# Patient Record
Sex: FEMALE | Race: WHITE | NOT HISPANIC OR LATINO | Employment: FULL TIME | URBAN - METROPOLITAN AREA
[De-identification: names, ages, dates, MRNs, and addresses within clinical notes are randomized per-mention and may not be internally consistent; named-entity substitution may affect disease eponyms.]

---

## 2017-03-10 ENCOUNTER — COMMUNICATION - HEALTHEAST (OUTPATIENT)
Dept: FAMILY MEDICINE | Facility: CLINIC | Age: 42
End: 2017-03-10

## 2017-04-26 ENCOUNTER — OFFICE VISIT - HEALTHEAST (OUTPATIENT)
Dept: FAMILY MEDICINE | Facility: CLINIC | Age: 42
End: 2017-04-26

## 2017-04-26 DIAGNOSIS — R53.81 OTHER MALAISE AND FATIGUE: ICD-10-CM

## 2017-04-26 DIAGNOSIS — G43.909 MIGRAINE HEADACHE: ICD-10-CM

## 2017-04-26 DIAGNOSIS — R53.83 OTHER MALAISE AND FATIGUE: ICD-10-CM

## 2017-04-26 DIAGNOSIS — K21.9 ACID REFLUX: ICD-10-CM

## 2017-04-26 DIAGNOSIS — Z00.00 ROUTINE GENERAL MEDICAL EXAMINATION AT A HEALTH CARE FACILITY: ICD-10-CM

## 2017-04-26 ASSESSMENT — MIFFLIN-ST. JEOR: SCORE: 1462.82

## 2017-05-01 ENCOUNTER — COMMUNICATION - HEALTHEAST (OUTPATIENT)
Dept: FAMILY MEDICINE | Facility: CLINIC | Age: 42
End: 2017-05-01

## 2017-05-04 ENCOUNTER — OFFICE VISIT - HEALTHEAST (OUTPATIENT)
Dept: FAMILY MEDICINE | Facility: CLINIC | Age: 42
End: 2017-05-04

## 2017-05-04 DIAGNOSIS — Z13.220 SCREENING, LIPID: ICD-10-CM

## 2017-05-04 DIAGNOSIS — M79.674 PAIN OF GREAT TOE, RIGHT: ICD-10-CM

## 2017-05-04 DIAGNOSIS — Z13.1 SCREENING FOR DIABETES MELLITUS: ICD-10-CM

## 2017-05-04 LAB
CHOLEST SERPL-MCNC: 187 MG/DL
FASTING STATUS PATIENT QL REPORTED: YES
HDLC SERPL-MCNC: 49 MG/DL
LDLC SERPL CALC-MCNC: 125 MG/DL
TRIGL SERPL-MCNC: 64 MG/DL

## 2017-05-08 ENCOUNTER — OFFICE VISIT - HEALTHEAST (OUTPATIENT)
Dept: FAMILY MEDICINE | Facility: CLINIC | Age: 42
End: 2017-05-08

## 2017-05-08 DIAGNOSIS — R53.83 FATIGUE: ICD-10-CM

## 2017-05-08 ASSESSMENT — MIFFLIN-ST. JEOR: SCORE: 1467.35

## 2017-05-09 ENCOUNTER — RECORDS - HEALTHEAST (OUTPATIENT)
Dept: ADMINISTRATIVE | Facility: OTHER | Age: 42
End: 2017-05-09

## 2017-05-18 ENCOUNTER — OFFICE VISIT - HEALTHEAST (OUTPATIENT)
Dept: FAMILY MEDICINE | Facility: CLINIC | Age: 42
End: 2017-05-18
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
## 2017-06-06 ENCOUNTER — OFFICE VISIT - HEALTHEAST (OUTPATIENT)
Dept: FAMILY MEDICINE | Facility: CLINIC | Age: 42
End: 2017-06-06
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
## 2017-06-06 DIAGNOSIS — S13.4XXD WHIPLASH INJURY SYNDROME, SUBSEQUENT ENCOUNTER: ICD-10-CM

## 2017-06-12 ENCOUNTER — OFFICE VISIT - HEALTHEAST (OUTPATIENT)
Dept: PHYSICAL THERAPY | Facility: REHABILITATION | Age: 42
End: 2017-06-12

## 2017-06-12 DIAGNOSIS — M54.2 ACUTE NECK PAIN: ICD-10-CM

## 2017-06-12 DIAGNOSIS — R29.898 DECREASED ROM OF NECK: ICD-10-CM

## 2017-06-12 DIAGNOSIS — Z87.828 HISTORY OF MOTOR VEHICLE ACCIDENT: ICD-10-CM

## 2017-06-26 ENCOUNTER — AMBULATORY - HEALTHEAST (OUTPATIENT)
Dept: FAMILY MEDICINE | Facility: CLINIC | Age: 42
End: 2017-06-26

## 2017-06-26 ENCOUNTER — COMMUNICATION - HEALTHEAST (OUTPATIENT)
Dept: FAMILY MEDICINE | Facility: CLINIC | Age: 42
End: 2017-06-26

## 2017-06-26 DIAGNOSIS — L98.9 SKIN LESIONS: ICD-10-CM

## 2017-06-26 DIAGNOSIS — L73.2 HIDRADENITIS SUPPURATIVA: ICD-10-CM

## 2017-10-04 ENCOUNTER — COMMUNICATION - HEALTHEAST (OUTPATIENT)
Dept: FAMILY MEDICINE | Facility: CLINIC | Age: 42
End: 2017-10-04

## 2017-10-06 ENCOUNTER — AMBULATORY - HEALTHEAST (OUTPATIENT)
Dept: NURSING | Facility: CLINIC | Age: 42
End: 2017-10-06

## 2017-10-20 ENCOUNTER — COMMUNICATION - HEALTHEAST (OUTPATIENT)
Dept: FAMILY MEDICINE | Facility: CLINIC | Age: 42
End: 2017-10-20

## 2017-10-20 ENCOUNTER — AMBULATORY - HEALTHEAST (OUTPATIENT)
Dept: FAMILY MEDICINE | Facility: CLINIC | Age: 42
End: 2017-10-20

## 2017-10-20 DIAGNOSIS — L73.2 HIDRADENITIS: ICD-10-CM

## 2017-10-23 ENCOUNTER — AMBULATORY - HEALTHEAST (OUTPATIENT)
Dept: FAMILY MEDICINE | Facility: CLINIC | Age: 42
End: 2017-10-23

## 2017-10-23 DIAGNOSIS — L72.11 PILAR CYSTS: ICD-10-CM

## 2017-11-10 ENCOUNTER — OFFICE VISIT - HEALTHEAST (OUTPATIENT)
Dept: FAMILY MEDICINE | Facility: CLINIC | Age: 42
End: 2017-11-10

## 2017-11-10 DIAGNOSIS — Z01.818 PREOPERATIVE CLEARANCE: ICD-10-CM

## 2017-11-10 ASSESSMENT — MIFFLIN-ST. JEOR: SCORE: 1372.1

## 2017-11-14 ENCOUNTER — COMMUNICATION - HEALTHEAST (OUTPATIENT)
Dept: FAMILY MEDICINE | Facility: CLINIC | Age: 42
End: 2017-11-14

## 2017-11-20 ENCOUNTER — ANESTHESIA - HEALTHEAST (OUTPATIENT)
Dept: SURGERY | Facility: CLINIC | Age: 42
End: 2017-11-20

## 2017-11-20 ENCOUNTER — SURGERY - HEALTHEAST (OUTPATIENT)
Dept: SURGERY | Facility: CLINIC | Age: 42
End: 2017-11-20

## 2017-11-20 ASSESSMENT — MIFFLIN-ST. JEOR: SCORE: 1372.1

## 2018-01-30 ENCOUNTER — COMMUNICATION - HEALTHEAST (OUTPATIENT)
Dept: FAMILY MEDICINE | Facility: CLINIC | Age: 43
End: 2018-01-30

## 2018-01-30 ENCOUNTER — AMBULATORY - HEALTHEAST (OUTPATIENT)
Dept: FAMILY MEDICINE | Facility: CLINIC | Age: 43
End: 2018-01-30

## 2018-01-30 DIAGNOSIS — G43.909 MIGRAINE: ICD-10-CM

## 2018-01-31 ENCOUNTER — COMMUNICATION - HEALTHEAST (OUTPATIENT)
Dept: FAMILY MEDICINE | Facility: CLINIC | Age: 43
End: 2018-01-31

## 2018-02-14 ENCOUNTER — OFFICE VISIT - HEALTHEAST (OUTPATIENT)
Dept: FAMILY MEDICINE | Facility: CLINIC | Age: 43
End: 2018-02-14

## 2018-02-14 DIAGNOSIS — M54.9 LEFT-SIDED BACK PAIN: ICD-10-CM

## 2018-02-14 DIAGNOSIS — Z00.00 ROUTINE GENERAL MEDICAL EXAMINATION AT A HEALTH CARE FACILITY: ICD-10-CM

## 2018-02-14 DIAGNOSIS — Z51.81 THERAPEUTIC DRUG MONITORING: ICD-10-CM

## 2018-02-14 DIAGNOSIS — E03.9 HYPOTHYROID: ICD-10-CM

## 2018-02-14 DIAGNOSIS — Z13.0 SCREENING FOR DEFICIENCY ANEMIA: ICD-10-CM

## 2018-02-14 DIAGNOSIS — Z13.220 SCREENING FOR HYPERLIPIDEMIA: ICD-10-CM

## 2018-02-14 DIAGNOSIS — M25.552 LEFT HIP PAIN: ICD-10-CM

## 2018-02-14 DIAGNOSIS — M79.605 LEFT LEG PAIN: ICD-10-CM

## 2018-02-14 ASSESSMENT — MIFFLIN-ST. JEOR: SCORE: 1290.45

## 2018-02-19 ENCOUNTER — RECORDS - HEALTHEAST (OUTPATIENT)
Dept: ADMINISTRATIVE | Facility: OTHER | Age: 43
End: 2018-02-19

## 2018-02-20 ENCOUNTER — COMMUNICATION - HEALTHEAST (OUTPATIENT)
Dept: FAMILY MEDICINE | Facility: CLINIC | Age: 43
End: 2018-02-20

## 2018-02-20 ENCOUNTER — AMBULATORY - HEALTHEAST (OUTPATIENT)
Dept: LAB | Facility: CLINIC | Age: 43
End: 2018-02-20

## 2018-02-20 DIAGNOSIS — Z13.220 SCREENING FOR HYPERLIPIDEMIA: ICD-10-CM

## 2018-02-20 DIAGNOSIS — Z51.81 THERAPEUTIC DRUG MONITORING: ICD-10-CM

## 2018-02-20 DIAGNOSIS — Z13.0 SCREENING FOR DEFICIENCY ANEMIA: ICD-10-CM

## 2018-02-20 DIAGNOSIS — E03.9 HYPOTHYROID: ICD-10-CM

## 2018-02-20 LAB
ANION GAP SERPL CALCULATED.3IONS-SCNC: 5 MMOL/L (ref 5–18)
BUN SERPL-MCNC: 11 MG/DL (ref 8–22)
CALCIUM SERPL-MCNC: 9.1 MG/DL (ref 8.5–10.5)
CHLORIDE BLD-SCNC: 108 MMOL/L (ref 98–107)
CHOLEST SERPL-MCNC: 159 MG/DL
CO2 SERPL-SCNC: 27 MMOL/L (ref 22–31)
CREAT SERPL-MCNC: 0.82 MG/DL (ref 0.6–1.1)
FASTING STATUS PATIENT QL REPORTED: YES
GFR SERPL CREATININE-BSD FRML MDRD: >60 ML/MIN/1.73M2
GLUCOSE BLD-MCNC: 79 MG/DL (ref 70–125)
HDLC SERPL-MCNC: 47 MG/DL
HGB BLD-MCNC: 11.4 G/DL (ref 12–16)
LDLC SERPL CALC-MCNC: 99 MG/DL
POTASSIUM BLD-SCNC: 4.4 MMOL/L (ref 3.5–5)
SODIUM SERPL-SCNC: 140 MMOL/L (ref 136–145)
T4 FREE SERPL-MCNC: 1 NG/DL (ref 0.7–1.8)
TRIGL SERPL-MCNC: 65 MG/DL
TSH SERPL DL<=0.005 MIU/L-ACNC: 1.83 UIU/ML (ref 0.3–5)

## 2018-04-04 ENCOUNTER — RECORDS - HEALTHEAST (OUTPATIENT)
Dept: ADMINISTRATIVE | Facility: OTHER | Age: 43
End: 2018-04-04

## 2018-06-13 ENCOUNTER — COMMUNICATION - HEALTHEAST (OUTPATIENT)
Dept: FAMILY MEDICINE | Facility: CLINIC | Age: 43
End: 2018-06-13

## 2018-07-23 ENCOUNTER — COMMUNICATION - HEALTHEAST (OUTPATIENT)
Dept: INTERNAL MEDICINE | Facility: CLINIC | Age: 43
End: 2018-07-23

## 2018-07-30 ENCOUNTER — OFFICE VISIT - HEALTHEAST (OUTPATIENT)
Dept: INTERNAL MEDICINE | Facility: CLINIC | Age: 43
End: 2018-07-30

## 2018-07-30 DIAGNOSIS — F43.21 SITUATIONAL DEPRESSION: ICD-10-CM

## 2018-07-30 DIAGNOSIS — F41.9 ANXIETY: ICD-10-CM

## 2018-07-30 DIAGNOSIS — R53.82 CHRONIC FATIGUE: ICD-10-CM

## 2018-07-30 DIAGNOSIS — L73.2 HYDRADENITIS: ICD-10-CM

## 2018-07-30 DIAGNOSIS — Z01.818 PRE-OP EXAM: ICD-10-CM

## 2018-07-30 LAB
BASOPHILS # BLD AUTO: 0 THOU/UL (ref 0–0.2)
BASOPHILS NFR BLD AUTO: 1 % (ref 0–2)
EOSINOPHIL # BLD AUTO: 0.1 THOU/UL (ref 0–0.4)
EOSINOPHIL NFR BLD AUTO: 1 % (ref 0–6)
ERYTHROCYTE [DISTWIDTH] IN BLOOD BY AUTOMATED COUNT: 12.5 % (ref 11–14.5)
HCT VFR BLD AUTO: 38.6 % (ref 35–47)
HGB BLD-MCNC: 12.7 G/DL (ref 12–16)
LYMPHOCYTES # BLD AUTO: 2.1 THOU/UL (ref 0.8–4.4)
LYMPHOCYTES NFR BLD AUTO: 29 % (ref 20–40)
MCH RBC QN AUTO: 28.4 PG (ref 27–34)
MCHC RBC AUTO-ENTMCNC: 32.8 G/DL (ref 32–36)
MCV RBC AUTO: 87 FL (ref 80–100)
MONOCYTES # BLD AUTO: 0.4 THOU/UL (ref 0–0.9)
MONOCYTES NFR BLD AUTO: 6 % (ref 2–10)
NEUTROPHILS # BLD AUTO: 4.4 THOU/UL (ref 2–7.7)
NEUTROPHILS NFR BLD AUTO: 63 % (ref 50–70)
PLATELET # BLD AUTO: 283 THOU/UL (ref 140–440)
PMV BLD AUTO: 8.1 FL (ref 7–10)
RBC # BLD AUTO: 4.45 MILL/UL (ref 3.8–5.4)
WBC: 7 THOU/UL (ref 4–11)

## 2018-07-30 ASSESSMENT — MIFFLIN-ST. JEOR: SCORE: 1260.06

## 2018-07-31 LAB
ANION GAP SERPL CALCULATED.3IONS-SCNC: 9 MMOL/L (ref 5–18)
BUN SERPL-MCNC: 13 MG/DL (ref 8–22)
CALCIUM SERPL-MCNC: 9.1 MG/DL (ref 8.5–10.5)
CHLORIDE BLD-SCNC: 106 MMOL/L (ref 98–107)
CO2 SERPL-SCNC: 27 MMOL/L (ref 22–31)
CREAT SERPL-MCNC: 0.81 MG/DL (ref 0.6–1.1)
GFR SERPL CREATININE-BSD FRML MDRD: >60 ML/MIN/1.73M2
GLUCOSE BLD-MCNC: 73 MG/DL (ref 70–125)
IRON SATN MFR SERPL: 27 % (ref 20–50)
IRON SERPL-MCNC: 86 UG/DL (ref 42–175)
POTASSIUM BLD-SCNC: 4.4 MMOL/L (ref 3.5–5)
SODIUM SERPL-SCNC: 142 MMOL/L (ref 136–145)
TIBC SERPL-MCNC: 323 UG/DL (ref 313–563)
TRANSFERRIN SERPL-MCNC: 258 MG/DL (ref 212–360)
TSH SERPL DL<=0.005 MIU/L-ACNC: 1.73 UIU/ML (ref 0.3–5)

## 2018-08-29 ENCOUNTER — COMMUNICATION - HEALTHEAST (OUTPATIENT)
Dept: FAMILY MEDICINE | Facility: CLINIC | Age: 43
End: 2018-08-29

## 2018-08-29 DIAGNOSIS — E03.9 HYPOTHYROIDISM: ICD-10-CM

## 2018-10-17 ENCOUNTER — AMBULATORY - HEALTHEAST (OUTPATIENT)
Dept: NURSING | Facility: CLINIC | Age: 43
End: 2018-10-17

## 2018-10-23 ENCOUNTER — COMMUNICATION - HEALTHEAST (OUTPATIENT)
Dept: INTERNAL MEDICINE | Facility: CLINIC | Age: 43
End: 2018-10-23

## 2018-10-23 DIAGNOSIS — F41.9 ANXIETY: ICD-10-CM

## 2018-10-23 DIAGNOSIS — F43.21 SITUATIONAL DEPRESSION: ICD-10-CM

## 2019-02-01 ENCOUNTER — COMMUNICATION - HEALTHEAST (OUTPATIENT)
Dept: FAMILY MEDICINE | Facility: CLINIC | Age: 44
End: 2019-02-01

## 2019-11-28 ENCOUNTER — HOSPITAL ENCOUNTER (EMERGENCY)
Facility: HOSPITAL | Age: 44
Discharge: HOME | End: 2019-11-28
Attending: EMERGENCY MEDICINE
Payer: COMMERCIAL

## 2019-11-28 VITALS
HEART RATE: 75 BPM | BODY MASS INDEX: 22.49 KG/M2 | OXYGEN SATURATION: 100 % | RESPIRATION RATE: 18 BRPM | WEIGHT: 135 LBS | HEIGHT: 65 IN | DIASTOLIC BLOOD PRESSURE: 78 MMHG | TEMPERATURE: 97.7 F | SYSTOLIC BLOOD PRESSURE: 126 MMHG

## 2019-11-28 DIAGNOSIS — S61.411A LACERATION OF RIGHT HAND WITHOUT FOREIGN BODY, INITIAL ENCOUNTER: Primary | ICD-10-CM

## 2019-11-28 PROCEDURE — 12001 RPR S/N/AX/GEN/TRNK 2.5CM/<: CPT | Performed by: NURSE PRACTITIONER

## 2019-11-28 PROCEDURE — 99282 EMERGENCY DEPT VISIT SF MDM: CPT | Mod: 25

## 2019-11-28 PROCEDURE — 0HQFXZZ REPAIR RIGHT HAND SKIN, EXTERNAL APPROACH: ICD-10-PCS | Performed by: EMERGENCY MEDICINE

## 2019-11-28 PROCEDURE — 90471 IMMUNIZATION ADMIN: CPT | Performed by: NURSE PRACTITIONER

## 2019-11-28 PROCEDURE — 90715 TDAP VACCINE 7 YRS/> IM: CPT | Performed by: NURSE PRACTITIONER

## 2019-11-28 PROCEDURE — 63600000 HC DRUGS/DETAIL CODE: Performed by: NURSE PRACTITIONER

## 2019-11-28 RX ORDER — ESCITALOPRAM OXALATE 10 MG/1
10 TABLET ORAL
COMMUNITY
Start: 2018-10-25 | End: 2020-01-10

## 2019-11-28 RX ORDER — FERROUS SULFATE 325(65) MG
1 TABLET ORAL
COMMUNITY

## 2019-11-28 RX ORDER — LEVOTHYROXINE SODIUM 25 UG/1
50 TABLET ORAL
COMMUNITY
Start: 2018-08-29 | End: 2020-02-28

## 2019-11-28 RX ORDER — SPIRONOLACTONE 50 MG/1
50 TABLET, FILM COATED ORAL
COMMUNITY
Start: 2017-06-26 | End: 2020-01-10

## 2019-11-28 RX ADMIN — TETANUS TOXOID, REDUCED DIPHTHERIA TOXOID AND ACELLULAR PERTUSSIS VACCINE, ADSORBED 0.5 ML: 5; 2.5; 8; 8; 2.5 SUSPENSION INTRAMUSCULAR at 12:50

## 2019-11-28 SDOH — HEALTH STABILITY: MENTAL HEALTH: HOW OFTEN DO YOU HAVE A DRINK CONTAINING ALCOHOL?: NEVER

## 2019-11-28 ASSESSMENT — ENCOUNTER SYMPTOMS
ARTHRALGIAS: 0
WEAKNESS: 0
WOUND: 1
NUMBNESS: 0
BRUISES/BLEEDS EASILY: 0

## 2019-11-28 NOTE — ED PROVIDER NOTES
"HPI     Chief Complaint   Patient presents with   • Upper Extremity Issue   • Laceration     44 y.o. female presents to ED c/o right hand laceration PTA. Pt reports she fell down the stairs on her bottom and sustained a laceration to her 3rd finger on her right hand. Unsure what she struck her hand against. She denies head injury or LOC from the fall. States she has been ambulatory since the fall without pain. Pt wrapped the wound in a towel and applied pressure to control the bleeding. Pt denies any pain, numbness or weakness in her hand. Pt reports last tetanus vaccination was 10 years ago. Pt is right hand dominant.           HPI     Patient History     Past Medical History:   Diagnosis Date   • Hyperthyroidism        History reviewed. No pertinent surgical history.    History reviewed. No pertinent family history.    Social History     Tobacco Use   • Smoking status: Never Smoker   • Smokeless tobacco: Never Used   Substance Use Topics   • Alcohol use: Never     Frequency: Never   • Drug use: Never       Systems Reviewed from Nursing Triage:  Tobacco  Allergies  Meds  Problems  Med Hx  Surg Hx  Soc Hx         Review of Systems     Review of Systems   Musculoskeletal: Negative for arthralgias.   Skin: Positive for wound (hand laceration).   Neurological: Negative for weakness and numbness.        (-) head injury   Hematological: Does not bruise/bleed easily.        Physical Exam     ED Triage Vitals [11/28/19 1226]   Temp Heart Rate Resp BP SpO2   36.5 °C (97.7 °F) 75 18 126/78 100 %      Temp src Heart Rate Source Patient Position BP Location FiO2 (%) (Set)   -- -- -- -- --                     Patient Vitals for the past 24 hrs:   BP Temp Pulse Resp SpO2 Height Weight   11/28/19 1226 126/78 36.5 °C (97.7 °F) 75 18 100 % 1.651 m (5' 5\") 61.2 kg (135 lb)                                       Physical Exam   Constitutional: She is oriented to person, place, and time. She appears well-developed. No distress. "   HENT:   Head: Atraumatic.   Eyes: Conjunctivae are normal.   Neck: Normal range of motion.   Pulmonary/Chest: Effort normal. No respiratory distress.   Musculoskeletal: She exhibits no deformity.        Right hand: She exhibits laceration.        Hands:  Neurological: She is alert and oriented to person, place, and time. No sensory deficit.   Skin: Skin is warm and dry.   Psychiatric: She has a normal mood and affect. Her behavior is normal.   Nursing note and vitals reviewed.           Laceration Repair  Date/Time: 11/28/2019 12:52 PM  Performed by: Pallante, Elizabeth P, CRNP  Authorized by: Elieser Ramirez DO     Consent:     Consent obtained:  Verbal    Consent given by:  Patient    Risks discussed:  Infection, pain and poor cosmetic result    Alternatives discussed:  No treatment  Anesthesia (see MAR for exact dosages):     Anesthesia method:  Local infiltration    Local anesthetic:  Lidocaine 1% w/o epi  Laceration details:     Location:  Hand    Hand location:  R hand, dorsum    Length (cm):  1.5  Repair type:     Repair type:  Simple  Pre-procedure details:     Preparation:  Patient was prepped and draped in usual sterile fashion  Exploration:     Hemostasis achieved with:  Direct pressure    Wound exploration: wound explored through full range of motion and entire depth of wound probed and visualized      Wound extent: no foreign bodies/material noted, no nerve damage noted, no tendon damage noted and no underlying fracture noted    Treatment:     Area cleansed with:  Betadine and saline    Amount of cleaning:  Extensive    Irrigation solution:  Sterile saline    Irrigation volume:  500cc    Irrigation method:  Pressure wash  Skin repair:     Repair method:  Sutures    Suture size:  5-0    Suture material:  Nylon    Suture technique:  Simple interrupted    Number of sutures:  4  Approximation:     Approximation:  Close  Post-procedure details:     Dressing:  Antibiotic ointment, non-adherent dressing  and splint for protection    Patient tolerance of procedure:  Tolerated well, no immediate complications        ED Course & MDM     Labs Reviewed - No data to display    No orders to display               MDM  Number of Diagnoses or Management Options  Laceration of right hand without foreign body, initial encounter:      Amount and/or Complexity of Data Reviewed  Discuss the patient with other providers: yes    Patient Progress  Patient progress: stable           ED Course as of Nov 28 1536   Thu Nov 28, 2019   1250 Impression: Right hand laceration  No bony tenderness or deformity, FROM without pain - xrays not indicated  Plan: Wound repair, update tetanus  Counseled on signs and symptoms of infection, wound care at home, follow-up with primary care doctor for suture removal and return precautions    [EP]      ED Course User Index  [EP] Pallante, Elizabeth P, CRNP         Clinical Impressions as of Nov 28 1536   Laceration of right hand without foreign body, initial encounter        Scribe Attestation  By signing my name below, Miguel BOLANOS, attest that this documentation has been prepared under the direction and in the presence of E. Pallante, CRNP.    11/28/2019 3:36 PM    Provider Attestation  I, Elizabeth P. Pallante, personally performed the services described in this documentation, as documented by the scribe in my presence, and it is both accurate and complete.  11/28/2019 3:36 PM             Miguel Seo  11/28/19 1230       Pallante, Elizabeth P, CRNP  11/28/19 1536

## 2019-11-28 NOTE — ED ATTESTATION NOTE
11/28/201912:53 PM  I have personally seen and examined the patient.  I reviewed and agree with the PA/NP/Resident's assessment and plan of care.         Elieser Ramirez, DO  11/28/19 1257

## 2019-11-28 NOTE — DISCHARGE INSTRUCTIONS
Your laceration was closed with 4 sutures today.  You may wash gently over the sutures with soap and water, apply antibiotic ointment (Neosporin, Polysporin, bacitracin) twice a day.  Keep covered with a Band-Aid as needed.  Do not submerge her hand under water until the sutures are removed.    Apply ice for 20 minutes at a time to reduce pain and swelling.  Take Tylenol and/or Motrin as needed for pain.  Take according to label instructions.    If you develop increased redness or swelling around the wound, red streaking up your hand, foul-smelling drainage from the wound or any other new or concerning symptoms return to the emergency department.

## 2019-12-07 ENCOUNTER — OFFICE VISIT (OUTPATIENT)
Dept: URGENT CARE | Facility: URGENT CARE | Age: 44
End: 2019-12-07
Payer: COMMERCIAL

## 2019-12-07 VITALS
HEIGHT: 65 IN | BODY MASS INDEX: 22.49 KG/M2 | WEIGHT: 135 LBS | HEART RATE: 65 BPM | DIASTOLIC BLOOD PRESSURE: 75 MMHG | TEMPERATURE: 97.9 F | SYSTOLIC BLOOD PRESSURE: 113 MMHG | OXYGEN SATURATION: 99 %

## 2019-12-07 DIAGNOSIS — Z48.02 VISIT FOR SUTURE REMOVAL: Primary | ICD-10-CM

## 2019-12-07 PROCEDURE — 99212 OFFICE O/P EST SF 10 MIN: CPT | Performed by: FAMILY MEDICINE

## 2019-12-07 RX ORDER — LEVOTHYROXINE SODIUM 25 UG/1
TABLET ORAL
Refills: 3 | COMMUNITY
Start: 2019-11-12

## 2019-12-07 RX ORDER — ESCITALOPRAM OXALATE 10 MG/1
10 TABLET ORAL DAILY
Refills: 3 | COMMUNITY
Start: 2019-11-17

## 2019-12-07 RX ORDER — SPIRONOLACTONE 50 MG/1
50 TABLET, FILM COATED ORAL
COMMUNITY
Start: 2017-06-26 | End: 2020-01-10

## 2019-12-07 ASSESSMENT — MIFFLIN-ST. JEOR: SCORE: 1268.24

## 2019-12-07 NOTE — PROGRESS NOTES
Subjective: See emergency room notes.  She had 4 sutures put in at the base of her middle finger on the dorsal side.    Objective: 2 cm laceration with 4 sutures, removed easily, no signs of infection.    Assessment and plan: Suture removal, sutures put in elsewhere, watch for infection in the next few days.

## 2020-02-07 ENCOUNTER — COMMUNICATION - HEALTHEAST (OUTPATIENT)
Dept: FAMILY MEDICINE | Facility: CLINIC | Age: 45
End: 2020-02-07

## 2020-02-07 DIAGNOSIS — K21.9 GASTROESOPHAGEAL REFLUX DISEASE WITHOUT ESOPHAGITIS: ICD-10-CM

## 2021-05-30 VITALS — HEIGHT: 65 IN | WEIGHT: 179 LBS | BODY MASS INDEX: 29.82 KG/M2

## 2021-05-31 ENCOUNTER — RECORDS - HEALTHEAST (OUTPATIENT)
Dept: ADMINISTRATIVE | Facility: CLINIC | Age: 46
End: 2021-05-31

## 2021-05-31 VITALS — BODY MASS INDEX: 26.49 KG/M2 | HEIGHT: 65 IN | WEIGHT: 159 LBS

## 2021-05-31 VITALS — WEIGHT: 180 LBS | HEIGHT: 65 IN | BODY MASS INDEX: 29.99 KG/M2

## 2021-05-31 VITALS — BODY MASS INDEX: 26.49 KG/M2 | WEIGHT: 159 LBS | HEIGHT: 65 IN

## 2021-05-31 VITALS — BODY MASS INDEX: 29.95 KG/M2 | WEIGHT: 180 LBS

## 2021-05-31 VITALS — BODY MASS INDEX: 29.79 KG/M2 | WEIGHT: 179 LBS

## 2021-06-01 VITALS — WEIGHT: 141 LBS | BODY MASS INDEX: 23.49 KG/M2 | HEIGHT: 65 IN

## 2021-06-01 VITALS — HEIGHT: 65 IN | BODY MASS INDEX: 22.38 KG/M2 | WEIGHT: 134.3 LBS

## 2021-06-10 NOTE — PROGRESS NOTES
JOSE Chen is a 41 year old lady who is here to discuss her labs. We had done a series of labs including thyroid tests. We did these for complaints of fatigue. Her FT4 and TSH are normal. However, her TPO antibodies are elevated. They are mildly elevated We discussed that these are indicative of autoimmune thyroiditis and predict that she will develop over time, thyroiditis. We discussed what that is and that there is no way of telling with certainty if this would occur. However, she does have symptoms of hypothyroid .     O - VS_ BP - 102/62 Height and weight are noted. This lady is alert and in NAD. We discussed the option of treating with synthetic thyroid. There is no objective guideline that recommends treating this. This would be a trial of treatment. We went over possible side effects to watch for and discussed that long term side effects are unknown. We certainly would have to follow her thyroid tests periodically. We would gauge success largely by symptoms.     ASS - 1. Abnormal blood chemistry     PLAN - 1. Will start Levothyroxine at 25 mcg a day. If any other side effects occur, I have asked her to call                2. I would like to follow up with her in 4 weeks.

## 2021-06-10 NOTE — PROGRESS NOTES
Assessment and Plan    1. Pain of great toe, right  No risk factors for or signs of gout.  This is likely arthritis.  Discussed decrease in wearing heels and use of shoes with stiff soles to minimize repeated flexion at right MTP joint    2. Screening for diabetes mellitus  - Glucose; Future  - Glucose    3. Screening, lipid  - Lipid Clarence    Reina Espinoza MD     -------------------------------------------    Chief Complaint   Patient presents with     Toe Pain     right big toe pain started 2 days ago, better today, cracking when walking and moving it.  Constant ache, can't walk if weight is on it, pain scale of 6 with pressure, otherwise a 1, can't recall any injuries, was wearing heels all day on Tuesday only.      Excellent summary above by GLENROY Cameron. Gustavo says she wears heels a lot, so this should not be a contributing factor for her pain. She has had no change in footwear.  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 Tends to wear high heels more    She has no risk factors for gout - no high purine food intake recently, she drinks only about 3 drinks per week, does not use diuretics, and has not family history of gout.    She is a patient of Dr. Looney. She recently had her physical and was not fasting.  She is fasting today and would like to be screened for hyperlipidemia and for diabetes.    Patient Active Problem List   Diagnosis     Fatigue     Vitamin D Deficiency     Acid reflux     Migraine headache         Current Outpatient Prescriptions:      cholecalciferol, vitamin D3, (VITAMIN D3) 2,000 unit cap, Take 1 tablet by mouth daily., Disp: , Rfl:      clindamycin (CLEOCIN T) 1 % external solution, Apply 1 application topically daily. , Disp: , Rfl:      melatonin 3 mg Tab tablet, Take 3 mg by mouth bedtime as needed., Disp: , Rfl:      ranitidine (ZANTAC) 150 MG tablet, Take 150 mg by mouth  daily., Disp: , Rfl:      rizatriptan (MAXALT-MLT) 10 MG disintegrating tablet, TAKE 1 TABLET (10 MG TOTAL) BY MOUTH AS NEEDED FOR MIGRAINE. MAY REPEAT IN 2 HOURS IF NEEDED, Disp: 10 tablet, Rfl: 10     SULFACLEANSE 8-4 8-4 % Susp, Apply 1 application topically daily. , Disp: , Rfl:      doxylamine (UNISON) 25 mg tablet, Take 12.5 mg by mouth bedtime as needed for sleep., Disp: , Rfl:      minocycline (MINOCIN,DYNACIN) 50 MG capsule, , Disp: , Rfl: 3      There are no preventive care reminders to display for this patient.      History   Smoking Status     Never Smoker   Smokeless Tobacco     Never Used       History   Alcohol Use     3.0 - 3.6 oz/week     2 - 3 Glasses of wine, 3 Standard drinks or equivalent per week       Vitals:    05/04/17 0902   BP: 108/82   Pulse:    SpO2:      Body mass index is 29.95 kg/(m^2).     EXAM:    General appearance - alert, well appearing, and in no distress  Musculoskeletal - right foot: no swelling, erythema or warmht of any joints; pain with palpation of and passive ROM of right first MTP joint

## 2021-06-10 NOTE — PROGRESS NOTES
"FEMALE ADULT PREVENTIVE EXAM    CHIEF COMPLAINT:  Female preventive exam.    SUBJECTIVE:  Gustavo Harvey is a 41 y.o. female who presents for her routine physical exam.    Patient would like to address the following concerns today: 1. Concerned regarding mother's diagnosis of bladder cancer. Wondering what she needs to do to keep on top of this.  2. Having reflux for the last 6 months. Is under a lot of stress. Takes Zantac which works but has to take this daily.  3. Continuing fatigue. Feels most days that she wants to take a nap. She has had a sleep stoudy which was negative for apnea. She gets 8-9 hours of sleep a night and wakes feeling refreshed but tires easily.       GYNE HISTORY  Menses: regular  Sexually Active:   yes  Contraception:   Yes, condoms  Last Pap:  1-16  Abnormal Pap:  no  Vaginal Discharge:  no      She  has a past medical history of Inverted nipple.    Lab Results   Component Value Date    WBC 6.1 04/14/2016    HGB 14.2 04/26/2017    HCT 46.2 04/14/2016    MCV 89 04/14/2016     04/14/2016     04/26/2017    K 3.6 04/26/2017    BUN 14 04/26/2017     Lab Results   Component Value Date    CHOL 185 01/25/2016    HDL 48 (L) 01/25/2016    LDLCALC 112 01/25/2016    TRIG 126 01/25/2016     Lab Results   Component Value Date    TSH 2.41 04/26/2017     BP Readings from Last 3 Encounters:   04/26/17 112/62   10/28/16 110/80   08/03/16 100/82       Surgeries:    Past Surgical History:   Procedure Laterality Date     EYE SURGERY Bilateral     \"lazy eye\"     MN DILATION/CURETTAGE,DIAGNOSTIC      Description: Dilation And Curettage;  Recorded: 07/22/2009;  Comments: miscarried 12-13-07     WISDOM TOOTH EXTRACTION         Family History:  Her family history includes Cancer (age of onset: 67) in her mother; Diabetes in her maternal grandfather; Heart disease in her maternal grandmother and paternal grandfather; No Medical Problems in her brother and daughter; Snoring in her father and paternal " grandmother. There is no history of BRCA 1/2, Breast cancer, Colon cancer, Endometrial cancer, or Ovarian cancer.    Social History:  She  reports that she has never smoked. She has never used smokeless tobacco. She reports that she drinks about 3.0 - 3.6 oz of alcohol per week  She reports that she does not use illicit drugs.    Medications:    Current Outpatient Prescriptions:      cholecalciferol, vitamin D3, (VITAMIN D3) 2,000 unit cap, Take 1 tablet by mouth daily., Disp: , Rfl:      clindamycin (CLEOCIN T) 1 % external solution, Apply 1 application topically daily. , Disp: , Rfl:      doxylamine (UNISON) 25 mg tablet, Take 12.5 mg by mouth bedtime as needed for sleep., Disp: , Rfl:      melatonin 3 mg Tab tablet, Take 3 mg by mouth bedtime as needed., Disp: , Rfl:      ranitidine (ZANTAC) 150 MG tablet, Take 150 mg by mouth daily., Disp: , Rfl:      rizatriptan (MAXALT-MLT) 10 MG disintegrating tablet, TAKE 1 TABLET (10 MG TOTAL) BY MOUTH AS NEEDED FOR MIGRAINE. MAY REPEAT IN 2 HOURS IF NEEDED, Disp: 10 tablet, Rfl: 10     SULFACLEANSE 8-4 8-4 % Susp, Apply 1 application topically daily. , Disp: , Rfl:      minocycline (MINOCIN,DYNACIN) 50 MG capsule, , Disp: , Rfl: 3  HELD MEDICATIONS: None.    Allergies:  No latex allergies.  No Known Allergies         RISK BEHAVIOR & HEALTH HABITS  Self Breast Exam:  yes  Regular Exercise:  yes  Balanced diet:  yes  Seat Belt Use: yes  Calcium intake/Osteoporosis prevention: Discussed calcium and Vitamin D recommendations    Dexa:  na  Colonoscopy: na  Mammogram:  no                DUE FOR THIS   Guns: NO  Sun Screen: YES  Dental Care: YES    REVIEW OF SYSTEMS:  Complete head to toe review of systems is otherwise negative except as above.    OBJECTIVE:  VITAL SIGNS:    Vitals:    04/26/17 1313   BP: 112/62     GENERAL:  Patient alert, in no acute distress.  EYES: PERRLA. Extraoccular movements intact, pupils equal, reactive to light and accommodation.  ENT:  Hearing grossly  normal.  Normal appearance to ears and nose.  Bilateral TM s, external canals, oropharynx normal. Normal lips, gums and teeth.  Normal nasal mucosa, septum and turbinates.  NECK:  Supple, without thyromegaly or mass.  RESP:  Clear to auscultation without crackles, wheezes or distress.  Normal respiratory effort.   CV:  Regular rate and rhythm without murmurs, rubs or gallops.  Normal carotid, abdominal aorta, femoral and pedal pulses.  No varicosities or edema.  ABDOMEN:  Soft, non-tender, without hepatosplenomegaly, masses, or hernias.   BREASTS:  Nontender, without masses, nipple discharge, erythema, or axillary adenopathy.    :    External genitalia:  Normal without lesions.  Urethra: Normal appearing  Vagina: Normal without discharge  Cervix: palpably normal. No tenderness on cervical motion.   Uterus:  Nontender, mobile, without masses  Ovaries: Normal without masses  LYMPHATIC: Normal palpation of neck, groin and axilla..  No lymphadenopathy.  No bruising.  NEURO:  Non-focal and intact.  PSYCHIATRIC:  Alert & oriented with normal mood and affect.  Good judgment and insight.  SKIN:  Normal inspection and palpation.  MUSCULOSKELETAL: Normal gait and station.      ASSESSMENT & PLAN  Gustavo was seen today for annual exam.    Diagnoses and all orders for this visit:    Fatigue  -     Hemoglobin  -     Comprehensive Metabolic Panel  -     T4, Free  -     Thyroid Stimulating Hormone (TSH)  -     C-Reactive Protein(CRP)  -     Lyme Antibody Cascade  -     Vitamin D, Total (25-Hydroxy)  -     Urinalysis    Acid reflux  -     Ambulatory referral for Upper GI Endoscopy      General  Immunizations reviewed and updated .  Recommended adequate calcium intake/osteoporosis prevention.  Discussed colon cancer screening at age 50, 45 if -American.  Diet and exercise reviewed,
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
## 2021-06-14 NOTE — PROGRESS NOTES
Assessment/Plan:      Visit for Preoperative Exam.     Patient approved for surgery with general or local anesthesia. Postoperative pain to be managed by surgeon during post-operative [employer] timeframe, typically 30-60 days for major surgery, and less for others. Labs will be done as indicated. Follow up as needed. Low Risk Surgery.   Labs drawn - none     Subjective:     Scheduled Procedure: Excision of recurrent pilar cysts  Surgery Date:  11/20/17  Surgery Location:  [facility]  Surgeon:  Dr. Randolph  Current Outpatient Prescriptions on File Prior to Visit   Medication Sig Dispense Refill     ibuprofen (ADVIL,MOTRIN) 200 MG tablet Take 200 mg by mouth every 6 (six) hours as needed for pain.       levothyroxine (SYNTHROID, LEVOTHROID) 25 MCG tablet Take 1 tablet (25 mcg total) by mouth daily. 30 tablet 6     melatonin 3 mg Tab tablet Take 3 mg by mouth bedtime as needed.       ranitidine (ZANTAC) 150 MG tablet Take 150 mg by mouth daily.       rizatriptan (MAXALT-MLT) 10 MG disintegrating tablet TAKE 1 TABLET (10 MG TOTAL) BY MOUTH AS NEEDED FOR MIGRAINE. MAY REPEAT IN 2 HOURS IF NEEDED 10 tablet 10     SULFACLEANSE 8-4 8-4 % Susp Apply 1 application topically daily.        doxylamine (UNISON) 25 mg tablet Take 12.5 mg by mouth bedtime as needed for sleep.       No current facility-administered medications on file prior to visit.          Current Outpatient Prescriptions   Medication Sig Dispense Refill     ibuprofen (ADVIL,MOTRIN) 200 MG tablet Take 200 mg by mouth every 6 (six) hours as needed for pain.       levothyroxine (SYNTHROID, LEVOTHROID) 25 MCG tablet Take 1 tablet (25 mcg total) by mouth daily. 30 tablet 6     melatonin 3 mg Tab tablet Take 3 mg by mouth bedtime as needed.       metroNIDAZOLE (METROGEL) 1 % gel        ranitidine (ZANTAC) 150 MG tablet Take 150 mg by mouth daily.       rizatriptan (MAXALT-MLT) 10 MG disintegrating tablet TAKE 1 TABLET (10 MG TOTAL) BY MOUTH AS NEEDED FOR  "MIGRAINE. MAY REPEAT IN 2 HOURS IF NEEDED 10 tablet 10     spironolactone (ALDACTONE) 50 MG tablet        SULFACLEANSE 8-4 8-4 % Susp Apply 1 application topically daily.        doxylamine (UNISON) 25 mg tablet Take 12.5 mg by mouth bedtime as needed for sleep.       No current facility-administered medications for this visit.        No Known Allergies    Immunization History   Administered Date(s) Administered     Influenza Y2c0-18, 10/27/2009     Influenza, inj, historic,unspecified 2007, 2009     Influenza, seasonal,quad inj 36+ mos 2015     Influenza, seasonal,quad inj 6-35 mos 10/20/2011, 10/01/2012     Influenza,seasonal quad, PF, 36+MOS 10/19/2016, 10/06/2017     Tdap 2010       Patient Active Problem List   Diagnosis     Fatigue     Vitamin D Deficiency     Acid reflux     Migraine headache       Past Medical History:   Diagnosis Date     Inverted nipple     rt nipple inverted and has been forever       Social History     Social History     Marital status:      Spouse name: N/A     Number of children: N/A     Years of education: N/A     Occupational History     Not on file.     Social History Main Topics     Smoking status: Never Smoker     Smokeless tobacco: Never Used     Alcohol use 3.0 - 3.6 oz/week     2 - 3 Glasses of wine, 3 Standard drinks or equivalent per week     Drug use: No     Sexual activity: Yes     Partners: Male     Birth control/ protection: Condom     Other Topics Concern     Not on file     Social History Narrative      2 para 1 is a  for a non-profit       Past Surgical History:   Procedure Laterality Date     EYE SURGERY Bilateral     \"lazy eye\"     SC DILATION/CURETTAGE,DIAGNOSTIC      Description: Dilation And Curettage;  Recorded: 2009;  Comments: miscarried 07     WISDOM TOOTH EXTRACTION         History of Present Illness  Recent Health  Fever: no  Chills: no  Fatigue: no  Chest Pain: no  Cough: no  Dyspnea: " "no  Urinary Frequency: no  Nausea: no  Vomiting: no  Diarrhea: no  Abdominal Pain: no  Easy Bruising: no  Lower Extremity Swelling: no  Poor Exercise Tolerance: no    Most recent Health Maintenance Visit:  7 month(s) ago    Pertinent History  Prior Anesthesia: yes  Previous Anesthesia Reaction:  no  Diabetes: no  Cardiovascular Disease: no  Pulmonary Disease: no  Renal Disease: no  GI Disease: no  Sleep Apnea: no  Thromboembolic Problems: no  Clotting Disorder: no  Bleeding Disorder: no  Transfusion Reaction: no  Impaired Immunity: no  Steroid use in the last 6 months: no  Frequent Aspirin use: no    Family history of FAM hx in grandparents    Social history of patient does not wear denture or partial plates, there is no transfusion refusal and there are no concerns regarding care after surgery    After surgery, the patient plans to recover at home with family.    Review of Systems  Review of Systems      Review of Systems - Negative except for HPI above       Objective:         Vitals:    11/10/17 1547   BP: 98/52   Temp: 98.6  F (37  C)   TempSrc: Oral   Weight: 159 lb (72.1 kg)   Height: 5' 5\" (1.651 m)       Physical Exam:  Physical Exam     Physical Examination: General appearance - alert, well appearing, and in no distress  Mental status - alert, oriented to person, place, and time  Eyes - pupils equal and reactive, extraocular eye movements intact  Ears - bilateral TM's and external ear canals normal  Nose - normal and patent, no erythema, discharge or polyps  Mouth - mucous membranes moist, pharynx normal without lesions and dentition good  Neck - supple, no significant adenopathy, no thyromegaly  Lymphatics - no palpable lymphadenopathy, no hepatosplenomegaly  Chest - clear to auscultation, no wheezes, rales or rhonchi, symmetric air entry  Heart - normal rate, regular rhythm, normal S1, S2, no murmurs, rubs, clicks or gallops  Abdomen - soft, nontender, nondistended, no masses or organomegaly  Neurological " - alert, oriented, normal speech, no focal findings or movement disorder noted  Musculoskeletal - no joint tenderness, deformity or swelling  Extremities - peripheral pulses normal, no pedal edema, no clubbing or cyanosis  Skin - normal coloration and turgor, no rashes, no suspicious skin lesions noted

## 2021-06-14 NOTE — ANESTHESIA POSTPROCEDURE EVALUATION
Patient: Gustavo Harvey   EXCISION LEFT LABIAL CYST & LEFT INNER GROIN CYST  Anesthesia type: general    Patient location: Phase II Recovery  Last vitals:   Vitals:    11/20/17 1031   BP:    Pulse: (!) 55   Resp: 14   Temp:    SpO2: 100%     Post vital signs: stable  Level of consciousness: awake and responds to simple questions  Post-anesthesia pain: pain controlled  Post-anesthesia nausea and vomiting: no  Pulmonary: unassisted, return to baseline  Cardiovascular: stable and blood pressure at baseline  Hydration: adequate  Anesthetic events: no    QCDR Measures:  ASA# 11 - Nuha-op Cardiac Arrest: ASA11B - Patient did NOT experience unanticipated cardiac arrest  ASA# 12 - Nuha-op Mortality Rate: ASA12B - Patient did NOT die  ASA# 13 - PACU Re-Intubation Rate: ASA13B - Patient did NOT require a new airway mgmt  ASA# 10 - Composite Anes Safety: ASA10A - No serious adverse event    Additional Notes:

## 2021-06-14 NOTE — ANESTHESIA CARE TRANSFER NOTE
Patient spontaneously breathing, LMA removed without complication.  O2 via mask at 10L.  To PACU, VSS, SBAR report to RN per institutional policy and procedure.  Transfer of care.    Last vitals:   Vitals:    11/20/17 0948   BP: 102/66   Pulse: 66   Resp: 12   Temp: 36.8  C (98.3  F)   SpO2: 98%     Patient's level of consciousness is drowsy  Spontaneous respirations: yes  Maintains airway independently: yes  Dentition unchanged: yes  Oropharynx: oropharynx clear of all foreign objects    QCDR Measures:  ASA# 20 - Surgical Safety Checklist: WHO surgical safety checklist completed prior to induction  PQRS# 430 - Adult PONV Prevention: 4558F - Pt received => 2 anti-emetic agents (different classes) preop & intraop  ASA# 8 - Peds PONV Prevention: NA - Not pediatric patient, not GA or 2 or more risk factors NOT present  PQRS# 424 - Nuha-op Temp Management: 4559F - At least one body temp DOCUMENTED => 35.5C or 95.9F within required timeframe  PQRS# 426 - PACU Transfer Protocol: - Transfer of care checklist used  ASA# 14 - Acute Post-op Pain: ASA14B - Patient did NOT experience pain >= 7 out of 10

## 2021-06-14 NOTE — ANESTHESIA PREPROCEDURE EVALUATION
Anesthesia Evaluation      Patient summary reviewed   No history of anesthetic complications     Airway   Mallampati: II  Neck ROM: full   Pulmonary - negative ROS and normal exam                          Cardiovascular - negative ROS  Exercise tolerance: > or = 4 METS  Rhythm: regular  Rate: normal,         Neuro/Psych      Comments: Migraines      Endo/Other    (+) hypothyroidism, obesity,   (-) not pregnant     GI/Hepatic/Renal    (+) GERD,             Dental - normal exam                          Anesthesia Plan  Planned anesthetic: general LMA    ASA 2   Induction: intravenous   Anesthetic plan and risks discussed with: patient  Anesthesia plan special considerations: antiemetics,   Post-op plan: routine recovery

## 2021-06-15 PROBLEM — G43.909 MIGRAINE HEADACHE: Status: ACTIVE | Noted: 2017-04-27

## 2021-06-15 PROBLEM — K21.9 ACID REFLUX: Status: ACTIVE | Noted: 2017-04-27

## 2021-06-16 NOTE — PROGRESS NOTES
"FEMALE ADULT PREVENTIVE EXAM    CHIEF COMPLAINT:  Female preventive exam.    SUBJECTIVE:  Gustavo Harvey is a 42 y.o. female who presents for her routine physical exam.    Patient would like to address the following concerns today: She is complaining of left-sided pain.  This is located on her left knee and left leg.  She notices that ongoing for months she is a runner and then seems to exacerbate this.      GYNE HISTORY  Menses: regular  Sexually Active:   yes  Contraception:   yes  Last Pap:  1/16  Abnormal Pap:  no  Vaginal Discharge:  no      She  has a past medical history of Acid reflux; Disease of thyroid gland; Fatigue; Inverted nipple; Migraine headache; and Vitamin D deficiency.    Lab Results   Component Value Date    WBC 6.1 04/14/2016    HGB 14.2 04/26/2017    HCT 46.2 04/14/2016    MCV 89 04/14/2016     04/14/2016     04/26/2017    K 3.6 04/26/2017    BUN 14 04/26/2017     Lab Results   Component Value Date    CHOL 187 05/04/2017    HDL 49 (L) 05/04/2017    LDLCALC 125 05/04/2017    TRIG 64 05/04/2017     Lab Results   Component Value Date    TSH 2.41 04/26/2017     BP Readings from Last 3 Encounters:   02/14/18 (!) 82/42   11/20/17 101/77   11/10/17 98/52       Surgeries:    Past Surgical History:   Procedure Laterality Date     EYE SURGERY Bilateral     \"lazy eye\"     ME DILATION/CURETTAGE,DIAGNOSTIC      Description: Dilation And Curettage;  Recorded: 07/22/2009;  Comments: miscarried 12-13-07     TRUNK SKIN LESION EXCISIONAL BIOPSY Left 11/20/2017    Procedure:  EXCISION LEFT LABIAL CYST & LEFT INNER GROIN CYST;  Surgeon: Charlie Randolph MD;  Location: [facility];  Service:      WISDOM TOOTH EXTRACTION         Family History:  Her family history includes Cancer (age of onset: 67) in her mother; Diabetes in her maternal grandfather; Heart disease in her maternal grandmother and paternal grandfather; No Medical Problems in her brother and daughter; Snoring in her father " and paternal grandmother. There is no history of BRCA 1/2, Breast cancer, Colon cancer, Endometrial cancer, or Ovarian cancer.    Social History:  She  reports that she has never smoked. She has never used smokeless tobacco. She reports that she drinks about 3.0 - 3.6 oz of alcohol per week  She reports that she does not use illicit drugs.    Medications:    Current Outpatient Prescriptions:      ibuprofen (ADVIL,MOTRIN) 200 MG tablet, Take 200 mg by mouth every 6 (six) hours as needed for pain., Disp: , Rfl:      melatonin 3 mg Tab tablet, Take 3 mg by mouth bedtime as needed., Disp: , Rfl:      ranitidine (ZANTAC) 150 MG tablet, Take 150 mg by mouth daily., Disp: , Rfl:      rizatriptan (MAXALT-MLT) 10 MG disintegrating tablet, TAKE 1 TABLET (10 MG TOTAL) BY MOUTH AS NEEDED FOR MIGRAINE. MAY REPEAT IN 2 HOURS IF NEEDED, Disp: 10 tablet, Rfl: 10     spironolactone (ALDACTONE) 50 MG tablet, , Disp: , Rfl:      SULFACLEANSE 8-4 8-4 % Susp, Apply 1 application topically daily. , Disp: , Rfl:      SYNTHROID 25 mcg tablet, TAKE 1 TABLET (25MCG) BY MOUTH DAILY., Disp: 30 tablet, Rfl: 6     doxylamine (UNISON) 25 mg tablet, Take 12.5 mg by mouth bedtime as needed for sleep., Disp: , Rfl:      ranitidine (ZANTAC) 300 MG tablet, Take 1 tablet (300 mg total) by mouth at bedtime., Disp: 90 tablet, Rfl: 3     SUMAtriptan (IMITREX) 50 MG tablet, Take 1 tab q 2 hours prn HA - max of 200 mg /24 hours, Disp: 10 tablet, Rfl: 0  HELD MEDICATIONS: None.    Allergies:  No latex allergies.  No Known Allergies       RISK BEHAVIOR & HEALTH HABITS  Self Breast Exam:  no  Regular Exercise:  yes  Balanced diet:  yes  Seat Belt Use: yes  Calcium intake/Osteoporosis prevention:  Discussed calcium and Vitamin D recommendations    Dexa:  na  Colonoscopy: na  Mammogram:  4/15    Guns: NO  Sun Screen: YES  Dental Care: YES    REVIEW OF SYSTEMS:  Complete head to toe review of systems is otherwise negative except as above.    OBJECTIVE:  VITAL  SIGNS:    Vitals:    02/14/18 1425   BP: (!) 82/42     GENERAL:  Patient alert, in no acute distress.  EYES: PERRLA. Extraoccular movements intact, pupils equal, reactive to light and accommodation.  ENT:  Hearing grossly normal.  Normal appearance to ears and nose.  Bilateral TM s, external canals, oropharynx normal. Normal lips, gums and teeth.  Normal nasal mucosa, septum and turbinates.  NECK:  Supple, without thyromegaly or mass.  RESP:  Clear to auscultation without crackles, wheezes or distress.  Normal respiratory effort.   CV:  Regular rate and rhythm without murmurs, rubs or gallops.  Normal carotid, abdominal aorta, femoral and pedal pulses.  No varicosities or edema.  ABDOMEN:  Soft, non-tender, without hepatosplenomegaly, masses, or hernias.   BREASTS:  Nontender, without masses, nipple discharge, erythema, or axillary adenopathy.    :    External genitalia:  Normal without lesions.  Urethra: Normal appearing  Vagina: Normal without discharge  Cervix: Cervix palpably normal. No tenderness on cervical motion.  Uterus:  Nontender, mobile, without masses  Ovaries: Normal without masses  LYMPHATIC: Normal palpation of neck, groin and axilla..  No lymphadenopathy.  No bruising.  NEURO:  Non-focal and intact.  PSYCHIATRIC:  Alert & oriented with normal mood and affect.  Good judgment and insight.  SKIN:  Normal inspection and palpation.  MUSCULOSKELETAL: Normal gait and station.      ASSESSMENT & PLAN  Gustavo was seen today for annual exam.    Diagnoses and all orders for this visit:    Left-sided back pain  -     Ambulatory referral to Orthopedics    Left hip pain  -     Ambulatory referral to Orthopedics    Left leg pain  -     Ambulatory referral to Orthopedics    Screening for deficiency anemia  -     Hemoglobin; Future    Screening for hyperlipidemia  -     Lipid Cascade; Future    Hypothyroid  -     T4, Free; Future  -     Thyroid Stimulating Hormone (TSH); Future    Therapeutic drug monitoring  -      Basic Metabolic Panel; Future    Other orders  -     ranitidine (ZANTAC) 300 MG tablet; Take 1 tablet (300 mg total) by mouth at bedtime.      General  Immunizations reviewed and updated .  Recommended adequate calcium intake/osteoporosis prevention.  Discussed colon cancer screening at age 50, 45 if -American.  Diet and exercise reviewed

## 2021-06-19 NOTE — PROGRESS NOTES
[facility] Follow Up Note    Assessment/Plan:    1. Pre-op exam  Patient is medically stable for cyst excision.  We will do preoperative blood work.    2. Hydradenitis  Symptoms are well controlled with spironolactone and dermatology however patient would like to have subcutaneous medium sized cyst removed from her robotic.  Referral to surgery was provided  - HM1(CBC and Differential)  - Basic Metabolic Panel  - HM1 (CBC with Diff)  - Ambulatory referral to General Surgery    3. Chronic fatigue  Suspect secondary to depression and anxiety but will check metabolic panel.  Patient is on thyroid medication  - HM1(CBC and Differential)  - Thyroid Stimulating Hormone (TSH)  - Iron and Transferrin Iron Binding Capacity  - HM1 (CBC with Diff)    4. Situational depression and anxiety  We will start on Lexapro and trazodone.  If Lexapro does not agree with her we can always try Zoloft.  She will follow-up with me in 2 months.  - escitalopram oxalate (LEXAPRO) 10 MG tablet; 1/2 tab by mouth weekly, then full tab daily  Dispense: 30 tablet; Refill: 2    Angela Mendoza MD    Chief Complaint:  Chief Complaint   Patient presents with     Establish Care     needs new primary MD- previous Md retired     Cyst     bikini line, under breast, and buttocks. Needs referral for cyst disease to derm and surgeon        History of Present Illness:  Gustavo is a 42 y.o. female with history of vitamin D deficiency, acid reflux, migraines, hypothyroidism, history of being a daughter for blood and platelets, hydradenitis.  She is currently here to meet me for the first time.  She has several concerns to address.    Patient has history of hydradenitis and frequently develops sebaceous cyst.  She currently has one moderate-sized one in her right buttock.  There is no redness or pain but she wants it to be surgically removed because it is uncomfortable.  She has had this surgery in the past and tolerated it well.  She will go to  the same surgeon.  Dermatology also put her on spironolactone which has been very helpful.    Her previous surgeries include eye surgery, wisdom tooth removal and D&C.  She denies any bleeding, clotting or anesthesia problems.  No family history of premature cardiac death or bleeding or clotting.    She has no history of lung problems: No history of smoking or asthma.  No recent upper respiratory infections.    No heart disease.  Patient is a runner and runs total of 20 miles a week.  She denies any dizziness, palpitations, shortness of breath, chest pains or change in exercise tolerance.    Discussed that if she is scheduling her surgery in the next month that would serve as her preop visit.    Patient also is complaining about fatigue, fogginess and problems with concentration which has worsened in the last month.  She is currently sees a therapist.  Her  has several medical conditions which contributes to her mental state: He has bipolar and personality disorder, anxiety and recent diagnosis of type 2 diabetes.  Her  tried to commit suicide last year.  Patient also has 8-year-old daughter.  Currently her  has put a lot of strain on her.  Her PHQ 9 and kim 7 is consistent with moderate depression and anxiety.  Patient does sleep 8 hours a night but sleep is very interrupted.  She denies any snoring and had sleep study done in the past.  We discussed the trial of Lexapro and trazodone.  Of Lexapro does not agree with her we can try Zoloft.    Review of Systems:  A comprehensive review of systems was performed and was otherwise negative    PFSH:  Social History: Reviewed  History   Smoking Status     Never Smoker   Smokeless Tobacco     Never Used     Social History     Social History Narrative      2 para 1 is a  for a non-profit       Past History: Reviewed  Current Outpatient Prescriptions   Medication Sig Dispense Refill     ferrous sulfate 325 (65 FE) MG tablet Take 1  "tablet by mouth daily with breakfast.       ibuprofen (ADVIL,MOTRIN) 200 MG tablet Take 200 mg by mouth every 6 (six) hours as needed for pain.       levothyroxine (SYNTHROID) 25 MCG tablet TAKE 1 TABLET (25MCG) BY MOUTH DAILY. 30 tablet 6     melatonin 3 mg Tab tablet Take 3 mg by mouth bedtime as needed.       ranitidine (ZANTAC) 150 MG tablet Take 150 mg by mouth daily.       rizatriptan (MAXALT-MLT) 10 MG disintegrating tablet TAKE 1 TABLET (10 MG TOTAL) BY MOUTH AS NEEDED FOR MIGRAINE. MAY REPEAT IN 2 HOURS IF NEEDED 10 tablet 10     spironolactone (ALDACTONE) 50 MG tablet Take 50 mg by mouth daily.        SULFACLEANSE 8-4 8-4 % Susp Apply 1 application topically daily.        escitalopram oxalate (LEXAPRO) 10 MG tablet 1/2 tab by mouth weekly, then full tab daily 30 tablet 2     No current facility-administered medications for this visit.        Family History: Reviewed    Physical Exam:    Vitals:    07/30/18 1530   BP: 104/70   Pulse: 70   SpO2: 99%   Weight: 134 lb 4.8 oz (60.9 kg)   Height: 5' 5\" (1.651 m)     Wt Readings from Last 3 Encounters:   07/30/18 134 lb 4.8 oz (60.9 kg)   02/14/18 141 lb (64 kg)   11/20/17 159 lb (72.1 kg)     Body mass index is 22.35 kg/(m^2).    Constitutional:  Reveals a pleasant female.  Vitals:  Per nursing notes.  HEENT:No cervical LAD, no thyromegaly,  conjunctiva is pink, no scleral icterus, TMs are visualized and normal bl, oropharynx is clear, no exudates,   Cardiac:  Regular rate and rhythm,no murmurs, rubs, or gallops. Carotids without bruits. Legs without edema. Palpation of the radial pulse regular.  Lungs: Clear to auscultation bl.  Respiratory effort normal.  Abdomen:positive BS, soft, nontender, nondistended.  No hepato-splenomagaly  Skin:   Patient has subcutaneous cyst in her right buttock without associated pain or erythema.  Rheumatologic: Normal joints and nails of the hands.  Neurologic:  Cranial nerves II-XII intact.     Psychiatric: affect somewhat " depressed, memory intact.  No flight of ideas or pressured speech    Data Review:    Analysis and Summary of Old Records (2): Yes    Records Requested (1): No    Other History Summarized (from other people in the room) (2): No    Radiology Tests Summarized (XRAY/CT/MRI/DXA) (1): No    Labs Reviewed (1): Yes    Medicine Tests Reviewed (EKG/ECHO/COLONOSCOPY/EGD) (1): No    Independent Review of EKG or X-RAY (2): No

## 2021-06-23 NOTE — TELEPHONE ENCOUNTER
RN cannot approve Refill Request    RN can NOT refill this medication historical medication requested. Last office visit: 5/8/2017 Carlita Looney MD Last Physical: 2/14/2018 Last MTM visit: Visit date not found Last visit same specialty: 6/6/2017 Reina Espinoza MD.  Next visit within 3 mo: Visit date not found  Next physical within 3 mo: Visit date not found      Gisell Harry, [facility] Triage/Med Refill 2/3/2019    Requested Prescriptions   Pending Prescriptions Disp Refills     ranitidine (ZANTAC) 300 MG tablet [Pharmacy Med Name: RANITIDINE 300 MG TABLET] 90 tablet 3     Sig: TAKE 1 TABLET (300 MG TOTAL) BY MOUTH AT BEDTIME.    GI Medications Refill Protocol Passed - 2/1/2019  1:26 AM       Passed - PCP or prescribing provider visit in last 12 or next 3 months.    Last office visit with prescriber/PCP: 5/8/2017 Carlita Looney MD OR same dept: Visit date not found OR same specialty: 6/6/2017 Reina Espinoza MD  Last physical: 2/14/2018 Last MTM visit: Visit date not found   Next visit within 3 mo: Visit date not found  Next physical within 3 mo: Visit date not found  Prescriber OR PCP: Carlita Looney MD  Last diagnosis associated with med order: There are no diagnoses linked to this encounter.  If protocol passes may refill for 12 months if within 3 months of last provider visit (or a total of 15 months).

## 2021-07-31 ENCOUNTER — HEALTH MAINTENANCE LETTER (OUTPATIENT)
Age: 46
End: 2021-07-31

## 2021-09-25 ENCOUNTER — HEALTH MAINTENANCE LETTER (OUTPATIENT)
Age: 46
End: 2021-09-25

## 2021-11-20 ENCOUNTER — HEALTH MAINTENANCE LETTER (OUTPATIENT)
Age: 46
End: 2021-11-20

## 2022-08-27 ENCOUNTER — HEALTH MAINTENANCE LETTER (OUTPATIENT)
Age: 47
End: 2022-08-27

## 2023-01-07 ENCOUNTER — HEALTH MAINTENANCE LETTER (OUTPATIENT)
Age: 48
End: 2023-01-07

## 2023-09-23 ENCOUNTER — HEALTH MAINTENANCE LETTER (OUTPATIENT)
Age: 48
End: 2023-09-23